# Patient Record
Sex: FEMALE | Race: WHITE
[De-identification: names, ages, dates, MRNs, and addresses within clinical notes are randomized per-mention and may not be internally consistent; named-entity substitution may affect disease eponyms.]

---

## 2018-04-30 ENCOUNTER — HOSPITAL ENCOUNTER (OUTPATIENT)
Dept: HOSPITAL 92 - LABBT | Age: 43
Discharge: HOME | End: 2018-04-30
Attending: SURGERY
Payer: COMMERCIAL

## 2018-04-30 DIAGNOSIS — K43.2: ICD-10-CM

## 2018-04-30 DIAGNOSIS — Z01.818: Primary | ICD-10-CM

## 2018-04-30 LAB
ALBUMIN SERPL BCG-MCNC: 4.2 G/DL (ref 3.5–5)
ALP SERPL-CCNC: 57 U/L (ref 40–150)
ALT SERPL W P-5'-P-CCNC: 13 U/L (ref 8–55)
ANION GAP SERPL CALC-SCNC: 14 MMOL/L (ref 10–20)
AST SERPL-CCNC: 12 U/L (ref 5–34)
BASOPHILS # BLD AUTO: 0 THOU/UL (ref 0–0.2)
BASOPHILS NFR BLD AUTO: 0.2 % (ref 0–1)
BILIRUB SERPL-MCNC: 0.3 MG/DL (ref 0.2–1.2)
BUN SERPL-MCNC: 12 MG/DL (ref 7–18.7)
CALCIUM SERPL-MCNC: 9.5 MG/DL (ref 7.8–10.44)
CHLORIDE SERPL-SCNC: 105 MMOL/L (ref 98–107)
CO2 SERPL-SCNC: 21 MMOL/L (ref 22–29)
CREAT CL PREDICTED SERPL C-G-VRATE: 0 ML/MIN (ref 70–130)
EOSINOPHIL # BLD AUTO: 0.1 THOU/UL (ref 0–0.7)
EOSINOPHIL NFR BLD AUTO: 0.4 % (ref 0–10)
GLOBULIN SER CALC-MCNC: 2.9 G/DL (ref 2.4–3.5)
GLUCOSE SERPL-MCNC: 93 MG/DL (ref 70–105)
HGB BLD-MCNC: 14.4 G/DL (ref 12–16)
LYMPHOCYTES # BLD: 2.3 THOU/UL (ref 1.2–3.4)
LYMPHOCYTES NFR BLD AUTO: 17.2 % (ref 21–51)
MCH RBC QN AUTO: 30.7 PG (ref 27–31)
MCV RBC AUTO: 92.1 FL (ref 81–99)
MONOCYTES # BLD AUTO: 0.9 THOU/UL (ref 0.11–0.59)
MONOCYTES NFR BLD AUTO: 7.2 % (ref 0–10)
NEUTROPHILS # BLD AUTO: 9.9 THOU/UL (ref 1.4–6.5)
NEUTROPHILS NFR BLD AUTO: 75 % (ref 42–75)
PLATELET # BLD AUTO: 381 THOU/UL (ref 130–400)
POTASSIUM SERPL-SCNC: 4 MMOL/L (ref 3.5–5.1)
PREGS CONTROL BACKGROUND?: (no result)
PREGS CONTROL BAR APPEAR?: YES
RBC # BLD AUTO: 4.69 MILL/UL (ref 4.2–5.4)
SODIUM SERPL-SCNC: 136 MMOL/L (ref 136–145)
WBC # BLD AUTO: 13.2 THOU/UL (ref 4.8–10.8)

## 2018-04-30 PROCEDURE — 84703 CHORIONIC GONADOTROPIN ASSAY: CPT

## 2018-04-30 PROCEDURE — 85025 COMPLETE CBC W/AUTO DIFF WBC: CPT

## 2018-04-30 PROCEDURE — 80053 COMPREHEN METABOLIC PANEL: CPT

## 2018-05-04 ENCOUNTER — HOSPITAL ENCOUNTER (INPATIENT)
Dept: HOSPITAL 92 - SDC | Age: 43
LOS: 1 days | Discharge: HOME | DRG: 355 | End: 2018-05-05
Attending: SURGERY | Admitting: SURGERY
Payer: COMMERCIAL

## 2018-05-04 VITALS — BODY MASS INDEX: 26.5 KG/M2

## 2018-05-04 DIAGNOSIS — Z79.82: ICD-10-CM

## 2018-05-04 DIAGNOSIS — K43.9: Primary | ICD-10-CM

## 2018-05-04 PROCEDURE — 80048 BASIC METABOLIC PNL TOTAL CA: CPT

## 2018-05-04 PROCEDURE — 85025 COMPLETE CBC W/AUTO DIFF WBC: CPT

## 2018-05-04 PROCEDURE — 36415 COLL VENOUS BLD VENIPUNCTURE: CPT

## 2018-05-04 PROCEDURE — 0WUF4JZ SUPPLEMENT ABDOMINAL WALL WITH SYNTHETIC SUBSTITUTE, PERCUTANEOUS ENDOSCOPIC APPROACH: ICD-10-PCS | Performed by: SURGERY

## 2018-05-04 RX ADMIN — CEFOXITIN SCH MLS: 2 INJECTION, POWDER, FOR SOLUTION INTRAVENOUS at 22:08

## 2018-05-04 RX ADMIN — CEFOXITIN SCH MLS: 2 INJECTION, POWDER, FOR SOLUTION INTRAVENOUS at 14:13

## 2018-05-04 RX ADMIN — POTASSIUM CHLORIDE, DEXTROSE MONOHYDRATE AND SODIUM CHLORIDE SCH: 150; 5; 450 INJECTION, SOLUTION INTRAVENOUS at 21:35

## 2018-05-04 RX ADMIN — POTASSIUM CHLORIDE, DEXTROSE MONOHYDRATE AND SODIUM CHLORIDE SCH MLS: 150; 5; 450 INJECTION, SOLUTION INTRAVENOUS at 12:22

## 2018-05-04 RX ADMIN — FAMOTIDINE SCH: 10 INJECTION, SOLUTION INTRAVENOUS at 22:27

## 2018-05-04 NOTE — OP
PREOPERATIVE DIAGNOSIS:  Incisional ventral hernia.

 

SURGEON:  Gustavo Coe M.D.

 

PROCEDURE PERFORMED:  Laparoscopic ventral hernia repair with mesh.

 

INDICATIONS:  This is a 42-year-old female that had a distal pancreatectomy done open who developed a
n incisional hernia.

 

FINDINGS:  A 6 x 6 cm defect, 15 x 25 cm mesh used.

 

PROCEDURE IN DETAIL:  After informed consent was obtained, the patient was taken to the operating corona
m and given general endotracheal anesthesia.  She was placed in a supine position.  The abdomen was p
repped and draped in the usual fashion.  Local anesthesia infiltrated subcutaneously and deep.  A 12 
mm incision was performed a right flank.  A Veress needle inserted.  Drop test performed.  Pneumoperi
toneum was created to a volume of 2 liters of carbon dioxide.  Utilizing a bladeless 12 mm trocar and
 0 degree laparoscope, direct visual entry in the abdominal cavity was performed.  Pneumoperitoneum w
as created to a pressure of 15 mmHg.  Zero degree laparoscope inserted under direct vision, two 5-mm 
ports were placed, one right upper quadrant lateral and one right lower quadrant lateral.  Then lapar
oscopic lysis of adhesions was performed utilizing the LigaSure.  The abdominal wall was freed up to 
expose the hernia.  A 15 x 25 cm piece of mesh was used.  It was fashioned by placing alternating col
or, red and white sutures spaced in 8 areas around the periphery of the mesh.  The mesh was hydrated,
 rolled, and inserted intra-abdominally then unrolled and using the GraNee needle, the sutures were i
ndividually grasped and brought transcutaneous to secure the mesh to cover the defect.  Prior to that
, the defect was actually closed transversely utilizing a running 2-0 V-Loc and then the mesh was sec
ured to the abdominal wall by securing tying down the sutures to position the mesh optimally.  Then t
hey SecureStrap Tacker was used to further secure the mesh to the abdominal wall.  This was done unde
r a lower pressure of 10 mmHg.  Hemostasis was assured.  Trocars and retractors removed.  The skin cl
osed with interrupted 4-0 Rapide.  Dermabond applied.  The patient tolerated the procedure well and w
as transferred to recovery in good condition.  Sponge and needle count verified correct x2.

## 2018-05-04 NOTE — HP
CHIEF COMPLAINT:  Incisional hernia.

 

HISTORY OF PRESENT ILLNESS:  The patient is a 42-year-old female who underwent distal pancreatectomy 
at [facility], developed a ventral hernia.  It is causing some pain.  She is here for repair.

 

PAST MEDICAL HISTORY:  Significant for the pancreatic mass.

 

PAST SURGICAL HISTORY:  She had a distal pancreatectomy in 02/2016.

 

MEDICATIONS:  Levothyroxine, Allegra, Augmentin, aspirin, amoxicillin, Tylenol 3 and she occasionally
 takes a Medrol Dosepak.

 

FAMILY HISTORY:  Parents are alive in good health.

 

SOCIAL HISTORY:  She is , no tobacco, occasional alcohol.

 

ALLERGIES:  No known drug allergies.

 

PHYSICAL EXAMINATION:

GENERAL:  Height 5 foot 2, weight 146.  Body mass index 26.7.

VITAL SIGNS:  Blood pressure 147/95, pulse 76.

GENERAL:  Well-developed, well-nourished female in no apparent distress.

HEENT:  Unremarkable.

LUNGS:  Clear.

HEART:  Regular rate and rhythm.

ABDOMEN:  She has an incisional hernia 6 cm to the left of the umbilicus.  It is reducible.

EXTREMITIES:  Good pulses.  No pedal edema.

 

ASSESSMENT:  Incisional hernia.

 

PLAN:  Laparoscopic ventral hernia repair with mesh.

 

CONSENT:  I have discussed the planned procedure as well as risk of bleeding, infection, injury to zuleika
wel, recurrence of the hernia.  She understands and gives informed consent.

## 2018-05-05 VITALS — DIASTOLIC BLOOD PRESSURE: 82 MMHG | SYSTOLIC BLOOD PRESSURE: 131 MMHG | TEMPERATURE: 98.1 F

## 2018-05-05 LAB
ANION GAP SERPL CALC-SCNC: 8 MMOL/L (ref 10–20)
BASOPHILS # BLD AUTO: 0 THOU/UL (ref 0–0.2)
BASOPHILS NFR BLD AUTO: 0.1 % (ref 0–1)
BUN SERPL-MCNC: 7 MG/DL (ref 7–18.7)
CALCIUM SERPL-MCNC: 7.8 MG/DL (ref 7.8–10.44)
CHLORIDE SERPL-SCNC: 107 MMOL/L (ref 98–107)
CO2 SERPL-SCNC: 23 MMOL/L (ref 22–29)
CREAT CL PREDICTED SERPL C-G-VRATE: 108 ML/MIN (ref 70–130)
EOSINOPHIL # BLD AUTO: 0 THOU/UL (ref 0–0.7)
EOSINOPHIL NFR BLD AUTO: 0.2 % (ref 0–10)
GLUCOSE SERPL-MCNC: 162 MG/DL (ref 70–105)
HGB BLD-MCNC: 11.9 G/DL (ref 12–16)
LYMPHOCYTES # BLD: 3.1 THOU/UL (ref 1.2–3.4)
LYMPHOCYTES NFR BLD AUTO: 19.2 % (ref 21–51)
MCH RBC QN AUTO: 31.7 PG (ref 27–31)
MCV RBC AUTO: 91.1 FL (ref 81–99)
MONOCYTES # BLD AUTO: 1.1 THOU/UL (ref 0.11–0.59)
MONOCYTES NFR BLD AUTO: 7.1 % (ref 0–10)
NEUTROPHILS # BLD AUTO: 11.7 THOU/UL (ref 1.4–6.5)
NEUTROPHILS NFR BLD AUTO: 73.3 % (ref 42–75)
PLATELET # BLD AUTO: 335 THOU/UL (ref 130–400)
POTASSIUM SERPL-SCNC: 3.8 MMOL/L (ref 3.5–5.1)
RBC # BLD AUTO: 3.75 MILL/UL (ref 4.2–5.4)
SODIUM SERPL-SCNC: 134 MMOL/L (ref 136–145)
WBC # BLD AUTO: 16 THOU/UL (ref 4.8–10.8)

## 2018-05-05 RX ADMIN — CEFOXITIN SCH MLS: 2 INJECTION, POWDER, FOR SOLUTION INTRAVENOUS at 06:11

## 2018-05-05 RX ADMIN — POTASSIUM CHLORIDE, DEXTROSE MONOHYDRATE AND SODIUM CHLORIDE SCH MLS: 150; 5; 450 INJECTION, SOLUTION INTRAVENOUS at 06:52

## 2018-05-05 RX ADMIN — POTASSIUM CHLORIDE, DEXTROSE MONOHYDRATE AND SODIUM CHLORIDE SCH: 150; 5; 450 INJECTION, SOLUTION INTRAVENOUS at 15:10

## 2018-05-05 RX ADMIN — FAMOTIDINE SCH: 10 INJECTION, SOLUTION INTRAVENOUS at 08:40

## 2018-05-05 RX ADMIN — CEFOXITIN SCH: 2 INJECTION, POWDER, FOR SOLUTION INTRAVENOUS at 15:10

## 2018-05-05 NOTE — PRG
DATE OF SERVICE:  05/05/2018

 

SUBJECTIVE:  Darshana Amaral is doing well today.

 

OBJECTIVE:

VITAL SIGNS:  Temperature 98.1 degrees, pulse 77, blood pressure 131/82.

LUNGS:  Clear to auscultation.

CARDIAC:  Regular rate and rhythm without murmur or gallop.

ABDOMEN:  Soft, nontender.

 

Patient has taken the Norco, but does not like the way it makes her feel.  I have encouraged her to t
lazaro over-the-counter Tylenol 1000 mg q.i.d. p.r.n., Motrin 200 mg 3 tablets q.i.d. p.r.n.  I have wri
tten a prescription for Ultram #25, two refills to take as needed.  Avoid lifting over 25 pounds for 
6 weeks, wear her abdominal binder for two weeks and discharge home at this time and will follow up w
rich Coe in the next 2-3 weeks.  Her abdomen is soft, nontender.  Surgical wounds laparoscopic a
re clean and dry.